# Patient Record
Sex: FEMALE | Race: WHITE | NOT HISPANIC OR LATINO | URBAN - METROPOLITAN AREA
[De-identification: names, ages, dates, MRNs, and addresses within clinical notes are randomized per-mention and may not be internally consistent; named-entity substitution may affect disease eponyms.]

---

## 2022-02-08 ENCOUNTER — APPOINTMENT (RX ONLY)
Dept: URBAN - METROPOLITAN AREA CLINIC 53 | Facility: CLINIC | Age: 57
Setting detail: DERMATOLOGY
End: 2022-02-08

## 2022-02-08 DIAGNOSIS — L81.4 OTHER MELANIN HYPERPIGMENTATION: ICD-10-CM

## 2022-02-08 DIAGNOSIS — L82.1 OTHER SEBORRHEIC KERATOSIS: ICD-10-CM

## 2022-02-08 DIAGNOSIS — L65.9 NONSCARRING HAIR LOSS, UNSPECIFIED: ICD-10-CM

## 2022-02-08 DIAGNOSIS — D18.0 HEMANGIOMA: ICD-10-CM

## 2022-02-08 DIAGNOSIS — D22 MELANOCYTIC NEVI: ICD-10-CM

## 2022-02-08 PROBLEM — D22.5 MELANOCYTIC NEVI OF TRUNK: Status: ACTIVE | Noted: 2022-02-08

## 2022-02-08 PROBLEM — D18.01 HEMANGIOMA OF SKIN AND SUBCUTANEOUS TISSUE: Status: ACTIVE | Noted: 2022-02-08

## 2022-02-08 PROCEDURE — ? FULL BODY SKIN EXAM

## 2022-02-08 PROCEDURE — ? ORDER TESTS

## 2022-02-08 PROCEDURE — ? SUNSCREEN RECOMMENDATIONS

## 2022-02-08 PROCEDURE — ? COUNSELING

## 2022-02-08 PROCEDURE — 99203 OFFICE O/P NEW LOW 30 MIN: CPT

## 2022-02-08 ASSESSMENT — LOCATION DETAILED DESCRIPTION DERM
LOCATION DETAILED: RIGHT MEDIAL FRONTAL SCALP
LOCATION DETAILED: LEFT SUPERIOR PARIETAL SCALP
LOCATION DETAILED: LEFT INFERIOR MEDIAL UPPER BACK
LOCATION DETAILED: RIGHT INFERIOR UPPER BACK
LOCATION DETAILED: LEFT CENTRAL MALAR CHEEK
LOCATION DETAILED: LEFT MEDIAL UPPER BACK
LOCATION DETAILED: RIGHT SUPERIOR UPPER BACK
LOCATION DETAILED: LEFT SUPERIOR MEDIAL UPPER BACK
LOCATION DETAILED: RIGHT LATERAL MALAR CHEEK
LOCATION DETAILED: MID-FRONTAL SCALP
LOCATION DETAILED: LEFT LATERAL ABDOMEN

## 2022-02-08 ASSESSMENT — LOCATION SIMPLE DESCRIPTION DERM
LOCATION SIMPLE: RIGHT UPPER BACK
LOCATION SIMPLE: LEFT UPPER BACK
LOCATION SIMPLE: ANTERIOR SCALP
LOCATION SIMPLE: LEFT CHEEK
LOCATION SIMPLE: RIGHT CHEEK
LOCATION SIMPLE: ABDOMEN
LOCATION SIMPLE: SCALP
LOCATION SIMPLE: RIGHT SCALP

## 2022-02-08 ASSESSMENT — LOCATION ZONE DERM
LOCATION ZONE: SCALP
LOCATION ZONE: TRUNK
LOCATION ZONE: FACE

## 2022-02-08 NOTE — PROCEDURE: COUNSELING
Detail Level: Detailed
Detail Level: Simple
Patient Specific Counseling (Will Not Stick From Patient To Patient): Consider PRP in [place]

## 2022-02-08 NOTE — PROCEDURE: ORDER TESTS
Bill For Surgical Tray: no
Expected Date Of Service: 02/08/2022
Billing Type: Third-Party Bill
Performing Laboratory: 0
no

## 2022-10-06 ENCOUNTER — APPOINTMENT (RX ONLY)
Dept: URBAN - METROPOLITAN AREA CLINIC 54 | Facility: CLINIC | Age: 57
Setting detail: DERMATOLOGY
End: 2022-10-06

## 2022-10-06 DIAGNOSIS — L65.0 TELOGEN EFFLUVIUM: ICD-10-CM

## 2022-10-06 DIAGNOSIS — D485 NEOPLASM OF UNCERTAIN BEHAVIOR OF SKIN: ICD-10-CM

## 2022-10-06 PROBLEM — D48.5 NEOPLASM OF UNCERTAIN BEHAVIOR OF SKIN: Status: ACTIVE | Noted: 2022-10-06

## 2022-10-06 PROCEDURE — ? ORDER TESTS

## 2022-10-06 PROCEDURE — 11102 TANGNTL BX SKIN SINGLE LES: CPT

## 2022-10-06 PROCEDURE — 99213 OFFICE O/P EST LOW 20 MIN: CPT | Mod: 25

## 2022-10-06 PROCEDURE — ? ADDITIONAL NOTES

## 2022-10-06 PROCEDURE — ? BIOPSY BY SHAVE METHOD

## 2022-10-06 PROCEDURE — ? COUNSELING

## 2022-10-06 ASSESSMENT — LOCATION DETAILED DESCRIPTION DERM: LOCATION DETAILED: NASAL DORSUM

## 2022-10-06 ASSESSMENT — LOCATION ZONE DERM: LOCATION ZONE: NOSE

## 2022-10-06 ASSESSMENT — LOCATION SIMPLE DESCRIPTION DERM: LOCATION SIMPLE: NOSE

## 2022-10-06 NOTE — PROCEDURE: ORDER TESTS
Bill For Surgical Tray: no
Performing Laboratory: 0
Billing Type: Third-Party Bill
Expected Date Of Service: 10/06/2022

## 2022-10-06 NOTE — PROCEDURE: BIOPSY BY SHAVE METHOD

## 2023-04-24 ENCOUNTER — APPOINTMENT (RX ONLY)
Dept: URBAN - METROPOLITAN AREA CLINIC 54 | Facility: CLINIC | Age: 58
Setting detail: DERMATOLOGY
End: 2023-04-24

## 2023-04-24 PROBLEM — C44.311 BASAL CELL CARCINOMA OF SKIN OF NOSE: Status: ACTIVE | Noted: 2023-04-24

## 2023-04-24 PROCEDURE — ? COUNSELING

## 2023-04-24 PROCEDURE — 99213 OFFICE O/P EST LOW 20 MIN: CPT

## 2023-04-24 PROCEDURE — ? SCREENING FOR COVID-19

## 2023-04-24 PROCEDURE — ? DEFER

## 2023-04-24 NOTE — PROCEDURE: DEFER
Introduction Text (Please End With A Colon): The following procedure was deferred:
Instructions (Optional): Send notes to Mohs team
Size Of Lesion In Cm (Optional): 0
Detail Level: Detailed

## 2023-06-12 ENCOUNTER — APPOINTMENT (RX ONLY)
Dept: URBAN - METROPOLITAN AREA CLINIC 53 | Facility: CLINIC | Age: 58
Setting detail: DERMATOLOGY
End: 2023-06-12

## 2023-06-12 DIAGNOSIS — L82.1 OTHER SEBORRHEIC KERATOSIS: ICD-10-CM

## 2023-06-12 PROBLEM — C44.311 BASAL CELL CARCINOMA OF SKIN OF NOSE: Status: ACTIVE | Noted: 2023-06-12

## 2023-06-12 PROCEDURE — ? SCREENING FOR COVID-19

## 2023-06-12 PROCEDURE — ? COUNSELING

## 2023-06-12 PROCEDURE — 99212 OFFICE O/P EST SF 10 MIN: CPT

## 2023-06-12 ASSESSMENT — LOCATION ZONE DERM: LOCATION ZONE: NOSE

## 2023-06-12 ASSESSMENT — LOCATION DETAILED DESCRIPTION DERM: LOCATION DETAILED: NASAL DORSUM

## 2023-06-12 ASSESSMENT — LOCATION SIMPLE DESCRIPTION DERM: LOCATION SIMPLE: NOSE

## 2023-07-12 ENCOUNTER — APPOINTMENT (RX ONLY)
Dept: URBAN - METROPOLITAN AREA CLINIC 53 | Facility: CLINIC | Age: 58
Setting detail: DERMATOLOGY
End: 2023-07-12

## 2023-07-12 VITALS — HEART RATE: 64 BPM | DIASTOLIC BLOOD PRESSURE: 83 MMHG | SYSTOLIC BLOOD PRESSURE: 148 MMHG

## 2023-07-12 PROBLEM — C44.311 BASAL CELL CARCINOMA OF SKIN OF NOSE: Status: ACTIVE | Noted: 2023-07-12

## 2023-07-12 PROCEDURE — ? MOHS SURGERY

## 2023-07-12 PROCEDURE — ? REPAIR NOTE

## 2023-07-12 PROCEDURE — ? PRESCRIPTION

## 2023-07-12 PROCEDURE — 17312 MOHS ADDL STAGE: CPT

## 2023-07-12 PROCEDURE — 14060 TIS TRNFR E/N/E/L 10 SQ CM/<: CPT

## 2023-07-12 PROCEDURE — 17311 MOHS 1 STAGE H/N/HF/G: CPT

## 2023-07-12 RX ORDER — MUPIROCIN 20 MG/G
OINTMENT TOPICAL
Qty: 22 | Refills: 0 | Status: ERX | COMMUNITY
Start: 2023-07-12

## 2023-07-12 RX ADMIN — MUPIROCIN: 20 OINTMENT TOPICAL at 00:00

## 2023-07-12 NOTE — PROCEDURE: REPAIR NOTE
Discharge teaching and instructions for diagnosis/procedure of Bilateral Multilobular Pulmonary Embolism and Bilateral Proximal DVTs completed with patient using teachback method. AVS reviewed. Printed prescriptions given to patient. Patient voiced understanding regarding prescriptions, follow up appointments, and care of self at home. Discharged in a wheelchair to  home with support per family. V-Y Plasty Text: The defect edges were debeveled with a #15 scalpel blade.  Given the location of the defect, shape of the defect and the proximity to free margins an V-Y advancement flap was deemed most appropriate.  Using a sterile surgical marker, an appropriate advancement flap was drawn incorporating the defect and placing the expected incisions within the relaxed skin tension lines where possible.    The area thus outlined was incised deep to adipose tissue with a #15 scalpel blade.  The skin margins were undermined to an appropriate distance in all directions utilizing iris scissors.

## 2023-07-12 NOTE — PROCEDURE: REPAIR NOTE
Did You Provide Opioid Counseling: No Alert-The patient is alert, awake and responds to voice. The patient is oriented to time, place, and person. The triage nurse is able to obtain subjective information.

## 2023-07-17 ENCOUNTER — APPOINTMENT (RX ONLY)
Dept: URBAN - METROPOLITAN AREA CLINIC 61 | Facility: CLINIC | Age: 58
Setting detail: DERMATOLOGY
End: 2023-07-17

## 2023-07-17 DIAGNOSIS — Z48.817 ENCOUNTER FOR SURGICAL AFTERCARE FOLLOWING SURGERY ON THE SKIN AND SUBCUTANEOUS TISSUE: ICD-10-CM

## 2023-07-17 PROCEDURE — ? POST-OP WOUND CHECK

## 2023-07-17 PROCEDURE — 99024 POSTOP FOLLOW-UP VISIT: CPT

## 2023-07-17 ASSESSMENT — LOCATION DETAILED DESCRIPTION DERM: LOCATION DETAILED: NASAL ROOT

## 2023-07-17 ASSESSMENT — LOCATION ZONE DERM: LOCATION ZONE: NOSE

## 2023-07-17 ASSESSMENT — LOCATION SIMPLE DESCRIPTION DERM: LOCATION SIMPLE: NOSE

## 2023-07-17 NOTE — PROCEDURE: POST-OP WOUND CHECK
Detail Level: Detailed
Add 96136 Cpt? (Important Note: In 2017 The Use Of 48552 Is Being Tracked By Cms To Determine Future Global Period Reimbursement For Global Periods): yes
Wound Evaluated By: Sunil

## 2023-07-20 ENCOUNTER — APPOINTMENT (RX ONLY)
Dept: URBAN - METROPOLITAN AREA CLINIC 53 | Facility: CLINIC | Age: 58
Setting detail: DERMATOLOGY
End: 2023-07-20

## 2023-07-20 DIAGNOSIS — Z48.817 ENCOUNTER FOR SURGICAL AFTERCARE FOLLOWING SURGERY ON THE SKIN AND SUBCUTANEOUS TISSUE: ICD-10-CM

## 2023-07-20 PROCEDURE — ? POST-OP WOUND CHECK

## 2023-07-20 PROCEDURE — 99024 POSTOP FOLLOW-UP VISIT: CPT

## 2023-07-20 ASSESSMENT — LOCATION ZONE DERM: LOCATION ZONE: NOSE

## 2023-07-20 ASSESSMENT — LOCATION DETAILED DESCRIPTION DERM: LOCATION DETAILED: NASAL DORSUM

## 2023-07-20 ASSESSMENT — LOCATION SIMPLE DESCRIPTION DERM: LOCATION SIMPLE: NOSE

## 2023-07-20 NOTE — PROCEDURE: POST-OP WOUND CHECK
Detail Level: Detailed
Add 01625 Cpt? (Important Note: In 2017 The Use Of 70826 Is Being Tracked By Cms To Determine Future Global Period Reimbursement For Global Periods): yes
Wound Evaluated By: Yamileth MONAE
Additional Comments: Advancement rotation flap

## 2023-08-03 ENCOUNTER — APPOINTMENT (RX ONLY)
Dept: URBAN - METROPOLITAN AREA CLINIC 54 | Facility: CLINIC | Age: 58
Setting detail: DERMATOLOGY
End: 2023-08-03

## 2023-08-03 DIAGNOSIS — L81.4 OTHER MELANIN HYPERPIGMENTATION: ICD-10-CM | Status: STABLE

## 2023-08-03 DIAGNOSIS — D22 MELANOCYTIC NEVI: ICD-10-CM | Status: STABLE

## 2023-08-03 DIAGNOSIS — L85.3 XEROSIS CUTIS: ICD-10-CM | Status: STABLE

## 2023-08-03 DIAGNOSIS — L82.1 OTHER SEBORRHEIC KERATOSIS: ICD-10-CM | Status: STABLE

## 2023-08-03 DIAGNOSIS — Z85.828 PERSONAL HISTORY OF OTHER MALIGNANT NEOPLASM OF SKIN: ICD-10-CM | Status: RESOLVED

## 2023-08-03 PROBLEM — D22.5 MELANOCYTIC NEVI OF TRUNK: Status: ACTIVE | Noted: 2023-08-03

## 2023-08-03 PROCEDURE — 99213 OFFICE O/P EST LOW 20 MIN: CPT | Mod: 24

## 2023-08-03 PROCEDURE — ? SCREENING FOR COVID-19

## 2023-08-03 PROCEDURE — ? SUNSCREEN RECOMMENDATIONS

## 2023-08-03 PROCEDURE — ? FULL BODY SKIN EXAM

## 2023-08-03 PROCEDURE — ? COUNSELING

## 2023-08-03 ASSESSMENT — LOCATION SIMPLE DESCRIPTION DERM
LOCATION SIMPLE: LEFT CALF
LOCATION SIMPLE: RIGHT UPPER BACK
LOCATION SIMPLE: LEFT UPPER BACK
LOCATION SIMPLE: ABDOMEN

## 2023-08-03 ASSESSMENT — LOCATION DETAILED DESCRIPTION DERM
LOCATION DETAILED: RIGHT MID-UPPER BACK
LOCATION DETAILED: PERIUMBILICAL SKIN
LOCATION DETAILED: LEFT MID-UPPER BACK
LOCATION DETAILED: LEFT DISTAL CALF

## 2023-08-03 ASSESSMENT — LOCATION ZONE DERM
LOCATION ZONE: LEG
LOCATION ZONE: TRUNK

## 2024-08-19 ENCOUNTER — APPOINTMENT (RX ONLY)
Dept: URBAN - METROPOLITAN AREA CLINIC 54 | Facility: CLINIC | Age: 59
Setting detail: DERMATOLOGY
End: 2024-08-19

## 2024-08-19 DIAGNOSIS — L85.3 XEROSIS CUTIS: ICD-10-CM

## 2024-08-19 DIAGNOSIS — L81.4 OTHER MELANIN HYPERPIGMENTATION: ICD-10-CM

## 2024-08-19 DIAGNOSIS — D22 MELANOCYTIC NEVI: ICD-10-CM

## 2024-08-19 DIAGNOSIS — L82.1 OTHER SEBORRHEIC KERATOSIS: ICD-10-CM

## 2024-08-19 DIAGNOSIS — Z85.828 PERSONAL HISTORY OF OTHER MALIGNANT NEOPLASM OF SKIN: ICD-10-CM

## 2024-08-19 PROBLEM — D22.5 MELANOCYTIC NEVI OF TRUNK: Status: ACTIVE | Noted: 2024-08-19

## 2024-08-19 PROCEDURE — ? COUNSELING

## 2024-08-19 PROCEDURE — ? TREATMENT REGIMEN

## 2024-08-19 PROCEDURE — ? FULL BODY SKIN EXAM

## 2024-08-19 PROCEDURE — 99213 OFFICE O/P EST LOW 20 MIN: CPT

## 2024-08-19 PROCEDURE — ? SCREENING FOR COVID-19

## 2024-08-19 ASSESSMENT — LOCATION DETAILED DESCRIPTION DERM
LOCATION DETAILED: LEFT MID-UPPER BACK
LOCATION DETAILED: LEFT DISTAL CALF
LOCATION DETAILED: PERIUMBILICAL SKIN
LOCATION DETAILED: RIGHT MID-UPPER BACK

## 2024-08-19 ASSESSMENT — LOCATION ZONE DERM
LOCATION ZONE: TRUNK
LOCATION ZONE: LEG

## 2024-08-19 ASSESSMENT — LOCATION SIMPLE DESCRIPTION DERM
LOCATION SIMPLE: LEFT UPPER BACK
LOCATION SIMPLE: ABDOMEN
LOCATION SIMPLE: RIGHT UPPER BACK
LOCATION SIMPLE: LEFT CALF

## 2025-01-16 ENCOUNTER — APPOINTMENT (OUTPATIENT)
Dept: URBAN - METROPOLITAN AREA CLINIC 54 | Facility: CLINIC | Age: 60
Setting detail: DERMATOLOGY
End: 2025-01-16

## 2025-01-16 DIAGNOSIS — B35.1 TINEA UNGUIUM: ICD-10-CM

## 2025-01-16 DIAGNOSIS — L81.4 OTHER MELANIN HYPERPIGMENTATION: ICD-10-CM

## 2025-01-16 PROCEDURE — ? TREATMENT REGIMEN

## 2025-01-16 PROCEDURE — ? COUNSELING

## 2025-01-16 PROCEDURE — ? SUNSCREEN RECOMMENDATIONS

## 2025-01-16 PROCEDURE — ? PRESCRIPTION

## 2025-01-16 PROCEDURE — 99213 OFFICE O/P EST LOW 20 MIN: CPT

## 2025-01-16 RX ORDER — KETOCONAZOLE 20 MG/G
CREAM TOPICAL BID
Qty: 60 | Refills: 3 | Status: ERX | COMMUNITY
Start: 2025-01-16

## 2025-01-16 RX ADMIN — KETOCONAZOLE: 20 CREAM TOPICAL at 00:00

## 2025-01-16 ASSESSMENT — LOCATION SIMPLE DESCRIPTION DERM: LOCATION SIMPLE: RIGHT GREAT TOE

## 2025-01-16 ASSESSMENT — LOCATION ZONE DERM: LOCATION ZONE: TOENAIL

## 2025-01-16 ASSESSMENT — LOCATION DETAILED DESCRIPTION DERM: LOCATION DETAILED: RIGHT GREAT TOENAIL

## 2025-01-16 NOTE — PROCEDURE: SUNSCREEN RECOMMENDATIONS

## 2025-01-20 ENCOUNTER — APPOINTMENT (OUTPATIENT)
Dept: URBAN - METROPOLITAN AREA CLINIC 53 | Facility: CLINIC | Age: 60
Setting detail: DERMATOLOGY
End: 2025-01-20

## 2025-01-20 DIAGNOSIS — Z41.9 ENCOUNTER FOR PROCEDURE FOR PURPOSES OTHER THAN REMEDYING HEALTH STATE, UNSPECIFIED: ICD-10-CM

## 2025-01-20 PROCEDURE — ? COSMETIC CONSULTATION: CHEMICAL PEELS

## 2025-01-20 PROCEDURE — ? COSMETIC CONSULTATION: HYDRAFACIAL

## 2025-01-20 PROCEDURE — ? ADDITIONAL NOTES

## 2025-01-20 PROCEDURE — ? RECOMMENDATIONS

## 2025-01-20 PROCEDURE — ? COSMETIC QUOTE

## 2025-01-20 PROCEDURE — ? COSMETIC CONSULTATION: IPL

## 2025-01-20 ASSESSMENT — LOCATION SIMPLE DESCRIPTION DERM: LOCATION SIMPLE: INFERIOR FOREHEAD

## 2025-01-20 ASSESSMENT — LOCATION DETAILED DESCRIPTION DERM: LOCATION DETAILED: INFERIOR MID FOREHEAD

## 2025-01-20 ASSESSMENT — LOCATION ZONE DERM: LOCATION ZONE: FACE

## 2025-01-20 NOTE — PROCEDURE: ADDITIONAL NOTES
Render Risk Assessment In Note?: no
Detail Level: Zone
Additional Notes: Patient concerns are sun damage, pigmentation, tone and texture.

## 2025-01-20 NOTE — PROCEDURE: RECOMMENDATIONS
Recommendation Preamble: The following recommendations were made during the visit:
Detail Level: Zone
Recommendations (Free Text): VI Peel Advance, [place] IPL, Dermaplane.
Render Risk Assessment In Note?: no

## 2025-01-20 NOTE — PROCEDURE: COSMETIC QUOTE
Body Procedure 3 Units: 0
Laser 12 Free Text Discount (In Dollars- Use Only Numbers And Decimals): 0.00
Face Procedure 8 Price/Unit (In Dollars- Use Only Numbers And Decimals): 369.00
Misc Procedure 4 Price/Unit (In Dollars- Use Only Numbers And Decimals): 124.00
Face Procedure 5: SkinPen Microneedling scar
Face Procedure 5 Price/Unit (In Dollars- Use Only Numbers And Decimals): 200
Face Procedure 2: SkinPen Microneedling
Misc Procedure 1 Price/Unit (In Dollars- Use Only Numbers And Decimals): 12.00
Number Of Months: 1
Laser 9 Price/Unit (In Dollars- Use Only Numbers And Decimals): 200.00
Laser 13: Venus Viva Nanofractional add on Neck
Laser 6 Price/Unit (In Dollars- Use Only Numbers And Decimals): 350
Misc Procedure 8: Proscriptix calm + correct Phyto serum
Body Procedure 7 Price/Unit (In Dollars- Use Only Numbers And Decimals): 350.00
Misc Procedure 5: Retinol + AOX Renewal cream
Face Procedure 9: SkinPen Microneedling with PRP
Face Procedure 6: Benign Destruction
Body Procedure 1 Price/Unit (In Dollars- Use Only Numbers And Decimals): 550
Misc Procedure 2: Elta MD Lip Balm
Laser 7: Venus IPL nose
Laser 10: Venus Viva Nanofractional scar
Face Procedure 3 Price/Unit (In Dollars- Use Only Numbers And Decimals): 75.00
Laser 4: Post care Products
Show Monthly Cost Breakdown: No
Laser 4 Price/Unit (In Dollars- Use Only Numbers And Decimals): 50
Misc Procedure 9: Proscriptix lightweight moisturizer
Laser 14: Venus IPL neck
Body Procedure 8 Price/Unit (In Dollars- Use Only Numbers And Decimals): 329.00
Misc Procedure 6: BioRelift
Laser 15: Venus IPL upper lip broken capillaries
Face Procedure 10: Clinical extraction
Face Procedure 7: BioRepeel
Body Procedure 2 Price/Unit (In Dollars- Use Only Numbers And Decimals): 299.00
Misc Procedure 3: ZO Anti-Aging program
Laser 5: Venus Viva Nanofractional Perioral
Laser 11: Venus IPL hemangiomas
Face Procedure 4 Price/Unit (In Dollars- Use Only Numbers And Decimals): 100
Laser 8: Venus Viva Nanofractional Neck
Body Procedure 9: SkinPen Microneedling Acne scaring chest
Face Procedure 1 Price/Unit (In Dollars- Use Only Numbers And Decimals): 265.00
Laser 2: Venus Viva  Nanofractional upper left and right extremity
Body Procedure 3: SkinPen Microneedling Bikini area
Laser 12 Price/Unit (In Dollars- Use Only Numbers And Decimals): 150.00
Body Procedure 6: VI Peel Body arms
Misc Procedure 7 Price/Unit (In Dollars- Use Only Numbers And Decimals): 175.00
Laser 16 Price/Unit (In Dollars- Use Only Numbers And Decimals): 65.00
Misc Procedure 1: Proscriptix Activ Balancing Cleanser
Body Procedure 3 Price/Unit (In Dollars- Use Only Numbers And Decimals): 250
Face Procedure 8 Units: 2
Laser 9: Venus IPL for broken capillaries
Laser 6: Venus Viva Nanofractional  around the Eyes
Laser 12: Venus IPL Lentigines on chest
Body Procedure 10: SkinPen Microneedling scaring
Face Procedure 2 Price/Unit (In Dollars- Use Only Numbers And Decimals): 329.0
Laser 3: Venus IPL hands
Body Procedure 4: SkinPen Microneedling Body bilateral upper posterior arms
Body Procedure 7: VI Peel Body Neck
Misc Procedure 8 Price/Unit (In Dollars- Use Only Numbers And Decimals): 68.00
Body Procedure 1: VI Peel Body Scar
Misc Procedure 5 Price/Unit (In Dollars- Use Only Numbers And Decimals): 70.00
Face Procedure 9 Price/Unit (In Dollars- Use Only Numbers And Decimals): 845.00
Implant 2: ZO pigment control and brightening crème
Misc Procedure 2 Price/Unit (In Dollars- Use Only Numbers And Decimals): 14.00
Face Procedure 6 Price/Unit (In Dollars- Use Only Numbers And Decimals): 150
Face Procedure 3: Clinical milia extractions
Body Procedure 8: SkinPen Microneedling Neck
Laser 1: Venus IPL Face
Body Procedure 2: Hydrafacial Keravive half Scalp with Peptide spray
Misc Procedure 9 Price/Unit (In Dollars- Use Only Numbers And Decimals): 80.00
Body Procedure 5: VI Peel Body Back
Misc Procedure 6 Price/Unit (In Dollars- Use Only Numbers And Decimals): 60.00
Face Procedure 7 Price/Unit (In Dollars- Use Only Numbers And Decimals): 250.00
Misc Procedure 3 Price/Unit (In Dollars- Use Only Numbers And Decimals): 240.00
Face Procedure 4: Dermaplane add on
Face Procedure 1: Hydrafacial Deluxe
Laser 8 Price/Unit (In Dollars- Use Only Numbers And Decimals): 450
Include Sales Tax On Surgeon's Fees: Yes
Misc Procedure 7: ZO Radical Night Repair
Laser 2 Price/Unit (In Dollars- Use Only Numbers And Decimals): 650.00
Laser 16: Revision CMT post procedure cream
Face Procedure 8: VI Peel Advance
Body Procedure 6 Price/Unit (In Dollars- Use Only Numbers And Decimals): 550.00
Detail Level: Zone

## 2025-03-11 NOTE — PROCEDURE: MOHS SURGERY
Detail Level: Detailed Manual Repair Warning Statement: We plan on removing the manually selected variable below in favor of our much easier automatic structured text blocks found in the previous tab. We decided to do this to help make the flow better and give you the full power of structured data. Manual selection is never going to be ideal in our platform and I would encourage you to avoid using manual selection from this point on, especially since I will be sunsetting this feature. It is important that you do one of two things with the customized text below. First, you can save all of the text in a word file so you can have it for future reference. Second, transfer the text to the appropriate area in the Library tab. Lastly, if there is a flap or graft type which we do not have you need to let us know right away so I can add it in before the variable is hidden. No need to panic, we plan to give you roughly 6 months to make the change.